# Patient Record
Sex: FEMALE | HISPANIC OR LATINO | URBAN - METROPOLITAN AREA
[De-identification: names, ages, dates, MRNs, and addresses within clinical notes are randomized per-mention and may not be internally consistent; named-entity substitution may affect disease eponyms.]

---

## 2020-11-11 ENCOUNTER — EMERGENCY (EMERGENCY)
Facility: HOSPITAL | Age: 55
LOS: 1 days | Discharge: HOME | End: 2020-11-11
Attending: EMERGENCY MEDICINE
Payer: COMMERCIAL

## 2020-11-11 VITALS
DIASTOLIC BLOOD PRESSURE: 77 MMHG | RESPIRATION RATE: 16 BRPM | HEART RATE: 82 BPM | TEMPERATURE: 98 F | SYSTOLIC BLOOD PRESSURE: 154 MMHG | HEIGHT: 63 IN | WEIGHT: 179.9 LBS | OXYGEN SATURATION: 99 %

## 2020-11-11 VITALS
DIASTOLIC BLOOD PRESSURE: 80 MMHG | RESPIRATION RATE: 15 BRPM | OXYGEN SATURATION: 100 % | SYSTOLIC BLOOD PRESSURE: 141 MMHG | HEART RATE: 70 BPM

## 2020-11-11 PROCEDURE — 99284 EMERGENCY DEPT VISIT MOD MDM: CPT | Mod: 25

## 2020-11-11 PROCEDURE — 99284 EMERGENCY DEPT VISIT MOD MDM: CPT

## 2020-11-11 PROCEDURE — 93971 EXTREMITY STUDY: CPT

## 2020-11-11 PROCEDURE — 73562 X-RAY EXAM OF KNEE 3: CPT | Mod: 26,RT

## 2020-11-11 PROCEDURE — 73562 X-RAY EXAM OF KNEE 3: CPT

## 2020-11-11 PROCEDURE — 93971 EXTREMITY STUDY: CPT | Mod: 26,RT

## 2020-11-11 RX ORDER — ACETAMINOPHEN 500 MG
650 TABLET ORAL ONCE
Refills: 0 | Status: COMPLETED | OUTPATIENT
Start: 2020-11-11 | End: 2020-11-11

## 2020-11-11 RX ADMIN — Medication 650 MILLIGRAM(S): at 19:27

## 2020-11-11 NOTE — ED ADULT NURSE REASSESSMENT NOTE - ED CARDIAC RHYTHM
regular Unna Boot Text: An Unna boot was placed to help immobilize the limb and facilitate more rapid healing.

## 2020-11-11 NOTE — ED PROVIDER NOTE - NS ED ROS FT
General: No fever, no massive bleeding  Head: No HA, no ongoing scalp bleed  ENT: no neck pain, no sore throat  Resp: No SOB, no hemoptysis  Cardiovascular: No CP, No LOC  GI: No abdominal pain, No blood in stool  : No dysuria, no hematuria   MSK: No back pain, + limb pain  Skin: No painful or bleeding lesions  Neuro: No paresthesias, No focal deficits

## 2020-11-11 NOTE — ED PROVIDER NOTE - CLINICAL SUMMARY MEDICAL DECISION MAKING FREE TEXT BOX
55M otherwise healthy here with daughter c/o right knee pain worsening over 3 weeks.  Appears chronic in nature with no obvious signs of inflammatory disease or vascular compromise.  Treat with NSAIDs and follow up with PCM. 55M otherwise healthy here with daughter c/o right knee pain worsening over 3 weeks. with some swelling.  Will get xray and DVT US for further evaluation.

## 2020-11-11 NOTE — ED PROVIDER NOTE - PROGRESS NOTE DETAILS
POLO- sign out to me by Dr. perkins, neg u/s, Pt reassessed, pt feeling better at this time, vss, pt able to walk, talk and vocalized plan of action. Discussed in depth and explained to pt in depth the next steps that need to be taking including proper follow up with PCP or specialists. All incidental findings were discussed with pt as well. Pt verbalized their concerns and all questions were answered. Pt understands dispo and wants discharge. Given good instructions when to return to ED and importance of f/u.

## 2020-11-11 NOTE — ED ADULT NURSE REASSESSMENT NOTE - NS ED NURSE REASSESS COMMENT FT1
received pt aao x4. moving all extremities. some right calf swelling noted ass. w right knee pain. pt denies chest pain, sob, dizziness, nausea, vomiting. pt aao x4. respirations even and unlabored. skin color wnl warm and dry.  pt medicated as ordered, pending US  .

## 2020-11-11 NOTE — ED PROVIDER NOTE - PATIENT PORTAL LINK FT
You can access the FollowMyHealth Patient Portal offered by [facility] by registering at the following website: [email or web address]. By joining [employer]’s FollowMyHealth portal, you will also be able to view your health information using other applications (apps) compatible with our system.

## 2020-11-11 NOTE — ED PROVIDER NOTE - ATTENDING CONTRIBUTION TO CARE
HPI: 56yo f with no PMH presenting with sharp R-sided knee pain x 3 weeks, atraumatic, gradual in onset. Patient took Advil and Tylenol with some relief. Denies calf swelling. No fevers/chills.     PE:  Gen: NAD  Head: NCAT  HEENT: Oral mucosa moist, normal conjunctiva  CV: normal perfusion  Resp: no respiratory distress  Neuro: No focal neuro deficits  MSK: FROM all 4 extremities, no deformity, +TTP over posterior and lateral aspect of R knee, small effusion R knee, no crepitus  Skin: No rash, no bruising  Psych: Normal affect    MDM: Pt with R knee pain x 3 weeks- XR r/o fracture, US RLE r/o deep venous thrombosis, analgesia and reassess. Amy Abernathy DO         I performed a history and physical exam of the patient and discussed their management with the resident. I reviewed the resident's note and agree with the documented findings and plan of care. My medical decision making and observations are found above.

## 2020-11-11 NOTE — ED PROVIDER NOTE - PHYSICAL EXAMINATION
General: NAD, well appearing  HEENT: Normocephalic, EOM intact  Neck: No apparent stiffness or JVD  Pulm: Chest wall symmetric and nontender, lungs clear to ascultation   Cardiac: Regular rate and regular rhythm  Abdomen: Nontender and nondistended  Skin: Skin in warm, dry and intact without rashes or lesions.  Neuro: No apparent motor or sensory deficits  Psych: Alert, oriented, and cooperative  Rt Knee: no crepitus or joint laxity. Ambulatory. Mild swelling when compared to left.

## 2020-11-11 NOTE — ED PROVIDER NOTE - OBJECTIVE STATEMENT
55M otherwise healthy here with daughter c/o right knee pain worsening over 3 weeks.  Patient states it is sharp and all around knee.  No trauma to region.   She had this issue before and reports normal ortho eval years ago (records not available). Otherwise denies pain elsewhere, bleeding, SOB, chest pain, abdominal pain or fevers.  Denies other pertinent medical problems.  Denies any overt substance use.

## 2020-11-11 NOTE — ED ADULT TRIAGE NOTE - CHIEF COMPLAINT QUOTE
Pt A&Ox4 states "I have right knee pain for 3 weeks." Patient denies any injury, has swelling to right knee, able to ambulate

## 2025-01-15 ENCOUNTER — EMERGENCY (EMERGENCY)
Facility: HOSPITAL | Age: 60
LOS: 1 days | Discharge: HOME | End: 2025-01-15
Attending: STUDENT IN AN ORGANIZED HEALTH CARE EDUCATION/TRAINING PROGRAM
Payer: COMMERCIAL

## 2025-01-15 VITALS
HEIGHT: 61 IN | DIASTOLIC BLOOD PRESSURE: 97 MMHG | SYSTOLIC BLOOD PRESSURE: 159 MMHG | TEMPERATURE: 98 F | HEART RATE: 99 BPM | WEIGHT: 193.35 LBS | OXYGEN SATURATION: 99 % | RESPIRATION RATE: 20 BRPM

## 2025-01-15 VITALS
HEART RATE: 83 BPM | DIASTOLIC BLOOD PRESSURE: 79 MMHG | SYSTOLIC BLOOD PRESSURE: 117 MMHG | OXYGEN SATURATION: 97 % | TEMPERATURE: 98 F

## 2025-01-15 DIAGNOSIS — R04.0 EPISTAXIS: ICD-10-CM

## 2025-01-15 RX ORDER — LIDOCAINE HYDROCHLORIDE,EPINEPHRINE BITARTRATE 20; .005 MG/ML; MG/ML
10 INJECTION, SOLUTION EPIDURAL; INFILTRATION; INTRACAUDAL; PERINEURAL ONCE
Refills: 0 | Status: COMPLETED | OUTPATIENT
Start: 2025-01-15 | End: 2025-01-15

## 2025-01-15 RX ORDER — TRANEXAMIC ACID 650 MG/1
5 TABLET ORAL ONCE
Refills: 0 | Status: DISCONTINUED | OUTPATIENT
Start: 2025-01-15 | End: 2025-01-22

## 2025-01-15 RX ORDER — OXYMETAZOLINE HYDROCHLORIDE 0.05 G/100ML
2 SPRAY, METERED NASAL ONCE
Refills: 0 | Status: COMPLETED | OUTPATIENT
Start: 2025-01-15 | End: 2025-01-15

## 2025-01-15 RX ADMIN — OXYMETAZOLINE HYDROCHLORIDE 2 SPRAY(S): 0.05 SPRAY, METERED NASAL at 06:58

## 2025-01-15 RX ADMIN — LIDOCAINE HYDROCHLORIDE,EPINEPHRINE BITARTRATE 10 MILLILITER(S): 20; .005 INJECTION, SOLUTION EPIDURAL; INFILTRATION; INTRACAUDAL; PERINEURAL at 06:58

## 2025-01-15 NOTE — ED ADULT NURSE NOTE - OBJECTIVE STATEMENT
PT is alert and oriented, with a patent and self maintained airway, with non labored breathing. PT c/o of nose bleed since 0200, woke her up. Denies anti coag use.    PT denies CP, SOB, HA, N/V/D, Fevers or chills.

## 2025-01-15 NOTE — ED ADULT NURSE REASSESSMENT NOTE - NS ED NURSE REASSESS COMMENT FT1
Assumed pt care from previous, night RN HUANGW at this time. Patient AA&Ox4, resp even/unlabored, resting comfortably in bed, awaiting to be seen by provider. No signs of acute distress noted, safety maintained.

## 2025-01-15 NOTE — ED PROVIDER NOTE - OBJECTIVE STATEMENT
60 yo female no PMHx presents to ED c/o nose bleed x4 hours. Woke up from sleep. Did not self medicate PTA. No other complaints at this time.   Denies h/o nose bleeds, blood thinners, dizziness, lightheadedness.

## 2025-01-15 NOTE — ED PROVIDER NOTE - ATTENDING APP SHARED VISIT CONTRIBUTION OF CARE
Pt with four hours of R-sided nosebleed. unable to get it to stop. no AC. no other complaints.    physical - small bleeding from R nare. no septal hematoma.  +blood in the back of the throat.    plan - attempted tXA and packing but continued to bleed in back of throat and around packing.  ENT consulted for further management. if bleeding controlled to d/c with return and f/up instructions.

## 2025-01-15 NOTE — ED PROVIDER NOTE - PATIENT PORTAL LINK FT
You can access the FollowMyHealth Patient Portal offered by [facility] by registering at the following website: [email or web address]. By joining [employer]’s FollowMyHealth portal, you will also be able to view your health information using other applications (apps) compatible with our system.

## 2025-01-15 NOTE — ED PROVIDER NOTE - ADDITIONAL NOTES AND INSTRUCTIONS:
PT was evaluated At [facility] ED and was found to have a condition that warranted time of to rest and heal from WORK/SCHOOL.   Messi Valdez PA-C

## 2025-01-15 NOTE — ED PROVIDER NOTE - CLINICAL SUMMARY MEDICAL DECISION MAKING FREE TEXT BOX
60 yo female no PMHx presents to ED c/o nose bleed x4 hours. 60 yo female no PMHx presents to ED c/o nose bleed x4 hours. VS stable. On exam patient trickling blood out of left nostril, no hemorrhage. 60 yo female no PMHx presents to ED c/o nose bleed x4 hours. 60 yo female no PMHx presents to ED c/o nose bleed x4 hours. VS stable. On exam patient trickling blood out of left nostril, no hemorrhage. Plan to trial Afrin. Patient signed out to AM team with plan of if continues to bleed, will need nasal packing. 60 yo female no PMHx presents to ED c/o nose bleed x4 hours. 60 yo female no PMHx presents to ED c/o nose bleed x4 hours. VS stable. On exam patient trickling blood out of left nostril, no hemorrhage. Plan to trial Afrin. Patient signed out to AM team with plan of if continues to bleed, will need nasal packing.    Messi Valdez PA-C: PT signed out to YADY valdez after lengthy discussion about case with Dio. Packing placed by myself failed to control bleeding, ENT called to consult of pt, bleeding controlled by ENT, pt dc home with supportive care, follow up to PCP, ENT, Pt educated about when to return to the ED if needed. PT verbalizes that he understands all instructions and results. Pt informed that ED is open and available 24/7 365 days a yr, encouraged to return to the ED if they have any change in condition, or feel the need for revaluation.

## 2025-01-15 NOTE — ED PROVIDER NOTE - NSFOLLOWUPINSTRUCTIONS_ED_ALL_ED_FT
- Please call today to schedule follow up appointment with ENT specialist.   - For recurrence of nose bleed, apply direct pressure for 15 minutes.   - Please bring all documentation from your ED visit to any related future follow up appointment.  - Please call to schedule follow up appointment with your primary care physician within 24-48 hours.  - Please seek immediate medical attention or return to the ED for any new/worsening, signs/symptoms, or concerns.    Feel better!     - Llame hoy para programar lucy david de seguimiento con un otorrinolaringólogo.  - Para la recurrencia del sangrado nasal, aplique presión directa isidro 15 minutos.  - Traiga toda la documentación de hernandez visita al ED a cualquier david de seguimiento futura relacionada.  - Llame para programar lucy david de seguimiento con hernandez médico de atención primaria dentro de las 24 a 48 horas.  - Busque atención médica inmediata o regrese al servicio de urgencias por cualquier signo/síntoma o inquietud nuevos/empeorados.    ¡Sentirse mejor!      Hemorragia nasal en los adultos    Nosebleed, Adult      Cuando hay hemorragia nasal, sale james de la nariz. Las hemorragias nasales son frecuentes. Por lo general, no son un signo de lucy afección grave.    Puede vincent lucy hemorragia nasal cuando un vaso sanguíneo de la nariz comienza a sangrar o si el recubrimiento de la nariz (membrana mucosa) se agrieta. Las causas frecuentes de las hemorragias nasales pueden ser las siguientes:  •Alergias.      •Resfríos.      •Hurgarse la nariz.      •Sonarse la nariz con demasiada intensidad.      •Lucy lesión por meterse un objeto en la nariz o recibir un golpe en la nariz.      •Aire seco o frío.      Algunas causas menos frecuentes de las hemorragias nasales incluyen lo siguiente:  •Gases tóxicos.      •Algo anormal en la nariz o en los espacios llenos de aire en los huesos de la sneha (senos).      •Crecimientos en la nariz, timothy pólipos.      •Anticoagulantes o afecciones que retrasan la coagulación de la james.      •Ciertas enfermedades o procedimientos que irritan o secan las fosas nasales.        Siga estas instrucciones en hernandez casa:      Si tiene lucy hemorragia nasal:      •Siéntese e incline la asim ligeramente hacia adelante.      •Utilice lucy toalla o un pañuelo de papel limpios para apretar los orificios nasales debajo de la parte ósea de la nariz. Después de 5 minutos, suelte la nariz y compruebe si vuelve a sangrar. No quite la presión antes de haven tiempo. Si aún hay hemorragia, repita la presión y sosténgala isidro 5 minutos o hasta que la hemorragia se detenga.      • No coloque pañuelos de papel ni gasa en la nariz para detener la hemorragia.      •Evite recostarse e inclinar la asim hacia atrás. Eso puede provocar lucy acumulación de james en la garganta y producir ahogo o tos.      •Use un aerosol nasal descongestivo para aliviar lucy hemorragia nasal timothy se lo haya indicado el médico.      Después de lucy hemorragia nasal:     •Evite sonarse la nariz u olfatear isidro unas cuantas horas.      •No sonja esfuerzos, no levante objetos y no doble la cintura para agacharse isidro varios días. Puede volver a realizar otras actividades normales tan pronto timothy pueda.    •Si está tomando aspirina o anticoagulantes y tiene hemorragias nasales, hable con hernandez médico. Estos medicamentos pueden favorecer el sangrado.  •Pregúntele al médico si debe dejar de matt los medicamentos o si debe ajustar la dosis.      •No deje de matt los medicamentos que el médico le ha recomendado, excepto si se jacinda le indica que deje de tomarlos.      •Si la hemorragia nasal se debe a la sequedad de las membranas mucosas, use un gel o aerosol nasal de solución salina de venta jean y un humidificador timothy se lo haya indicado el médico. Denio mantendrá las mucosas húmedas y permitirá que se cicatricen. Si necesita usar yeison de estos productos:  •Elija yeison que sea soluble en agua.      •Use solamente la cantidad que necesita y con la frecuencia necesaria.      •No se acueste inmediatamente después de usarlo.      •Si tiene hemorragias nasales con frecuencia, hable con el médico sobre los tratamientos médicos. Las opciones pueden incluir lo siguiente:  •Cauterización nasal. Jacinda tratamiento detiene y previene las hemorragias nasales mediante el uso de un hisopo con lucy sustancia química o un dispositivo eléctrico con el que se queman ligeramente los vasos sanguíneos diminutos que están dentro de la nariz.      •Taponamiento nasal. Se coloca lucy gasa u otro material en la nariz para ejercer presión hafsa sobre la miroslava de la hemorragia.          Comuníquese con un médico si:    •Tiene fiebre.      •Tiene hemorragias nasales frecuentes o con mayor frecuencia que lo habitual.      •Presenta moretones con mucha facilidad.      •Tiene lucy hemorragia nasal por tener algo metido en la nariz.      •Tiene sangrado en la boca.      •Vomita o libera lucy sustancia marrón al toser.      •Tiene lucy hemorragia nasal después de comenzar un medicamento nuevo.        Solicite ayuda de inmediato si:    •Tiene lucy hemorragia nasal después de lucy caída o lucy lesión en la asim.      •Tiene lucy hemorragia nasal que no desaparece después de 20 minutos.      •Se siente mareado o débil.      •Tiene hemorragias fuera de lo común en otras partes del cuerpo.      •Tiene moretones fuera de lo común en otras partes del cuerpo.      •Tiene sudoración.      •Vomita james.        Resumen    •Cuando hay hemorragia nasal, sale james de la nariz. Las causas más frecuentes incluyen alergias, ulcy lesión en la nariz o aire frío o seco.      •El tratamiento inicial incluye aplicar presión isidro 5 minutos.       •Humectar la nariz con gel o aerosol nasal con solución salina después de lucy hemorragia nasal puede ayudar a prevenir futuras hemorragias.      •Busque ayuda de inmediato si la hemorragia nasal no desaparece después de 20 minutos.      Esta información no tiene timothy fin reemplazar el consejo del médico. Asegúrese de hacerle al médico cualquier pregunta que tenga.

## 2025-01-15 NOTE — CONSULT NOTE ADULT - PROBLEM SELECTOR RECOMMENDATION 9
-Pt encouraged to ambulate before being discharged to home  -All packings are dissolvable  -Keep nose moist: sleep with a humidifier, use saline sprays and gels frequently  -Followup with Dr. Michaels as needed

## 2025-01-15 NOTE — ED ADULT TRIAGE NOTE - CHIEF COMPLAINT QUOTE
PT reports to ED with complaints of nose bleed, reports nose bleed started since two in the morning. direct pressure applied in triage, blood noted to be trickling out of left nare.

## 2025-01-15 NOTE — ED PROVIDER NOTE - PHYSICAL EXAMINATION
Gen: Nontoxic, well appearing, in NAD.  Skin: Warm and dry as visualized.  Head: NC/AT.  Eyes: PERRLA. EOMI.  Nose: No septal hematoma. Right nostril not bleeding. Left nostril trickling blood. Unable to visualize bleeding site.   Mouth: Patent. No compromise.   Neck: Supple, FROM. Trachea midline.   Resp: No distress.  Cardio: Well perfused.  Ext: No deformities. MAEx4. FROM.   Neuro: A&Ox3. Appears nonfocal.   Psych: Normal affect and mood.

## 2025-01-15 NOTE — CONSULT NOTE ADULT - SUBJECTIVE AND OBJECTIVE BOX
ENT ISSUE/POD: L nosebleed    HPI:  58 yo female no PMHx presents to ED c/o nose bleed since 2:30 am. Woke up from sleep. Did not self medicate PTA. No other complaints at this time.   Denies h/o nose bleeds, blood thinners, dizziness, lightheadedness.  ENT consulted as bleeding refractory to TXA, afrin and 5.5 rapid rhino packing.          PAST MEDICAL & SURGICAL HISTORY:    Allergies    No Known Allergies    Intolerances      MEDICATIONS  (STANDING):  tranexamic acid Injectable for Topical Use 5 milliLiter(s) Topical once    MEDICATIONS  (PRN):      Social History:  No reported  toxic habits    Family history: No significant medical hx in first  degree relatives       ROS:   ENT: all negative except as noted in HPI   Pulm: denies SOB, cough, hemoptysis  Neuro: denies numbness/tingling, loss of sensation  Endo: denies heat/cold intolerance, excessive sweating      Vital Signs Last 24 Hrs  T(C): 36.6 (15 Cortez 2025 05:51), Max: 36.6 (15 Cortez 2025 05:51)  T(F): 97.8 (15 Cortez 2025 05:51), Max: 97.8 (15 Cortez 2025 05:51)  HR: 99 (15 Cortez 2025 05:51) (99 - 99)  BP: 159/97 (15 Cortez 2025 05:51) (159/97 - 159/97)  BP(mean): --  RR: 20 (15 Cortez 2025 05:51) (20 - 20)  SpO2: 99% (15 Cortez 2025 05:51) (99% - 99%)    Parameters below as of 15 Cortez 2025 05:51  Patient On (Oxygen Delivery Method): room air                  PHYSICAL EXAM:  Gen: NAD  Skin: No rashes, bruises, or lesions  Head: Normocephalic, Atraumatic  Face: no edema, erythema, or fluctuance. Parotid glands soft without mass  Eyes: no scleral injection  Nose: R nare patent, L nare packed w RR and oozing around packing anteriorly  Mouth: No Stridor / Drooling / Trismus.  Mucosa moist, tongue/uvula midline, mild blood streaking, no active oozing  Neck: Flat, supple, no lymphadenopathy, trachea midline, no masses  Lymphatic: No lymphadenopathy  Resp: breathing easily, no stridor  Neuro: facial nerve intact, no facial droop      Procedure: Control of nasal hemorrhage  L nasal packing removed, L nare suctioned. Septum intact with no abrasions or aberrant vessels.    ENT ISSUE/POD: L nosebleed    HPI:  58 yo female no PMHx presents to ED c/o nose bleed since 2:30 am. Woke up from sleep. Did not self medicate PTA. No other complaints at this time.   Denies h/o nose bleeds, blood thinners, dizziness, lightheadedness.  ENT consulted as bleeding refractory to TXA, afrin and 5.5 rapid rhino packing.          PAST MEDICAL & SURGICAL HISTORY:    Allergies    No Known Allergies    Intolerances      MEDICATIONS  (STANDING):  tranexamic acid Injectable for Topical Use 5 milliLiter(s) Topical once    MEDICATIONS  (PRN):      Social History:  No reported  toxic habits    Family history: No significant medical hx in first  degree relatives       ROS:   ENT: all negative except as noted in HPI   Pulm: denies SOB, cough, hemoptysis  Neuro: denies numbness/tingling, loss of sensation  Endo: denies heat/cold intolerance, excessive sweating      Vital Signs Last 24 Hrs  T(C): 36.6 (15 Cortez 2025 05:51), Max: 36.6 (15 Cortez 2025 05:51)  T(F): 97.8 (15 Cortez 2025 05:51), Max: 97.8 (15 Cortez 2025 05:51)  HR: 99 (15 Cortez 2025 05:51) (99 - 99)  BP: 159/97 (15 Cortez 2025 05:51) (159/97 - 159/97)  BP(mean): --  RR: 20 (15 Cortez 2025 05:51) (20 - 20)  SpO2: 99% (15 Cortez 2025 05:51) (99% - 99%)    Parameters below as of 15 Cortez 2025 05:51  Patient On (Oxygen Delivery Method): room air                  PHYSICAL EXAM:  Gen: NAD  Skin: No rashes, bruises, or lesions  Head: Normocephalic, Atraumatic  Face: no edema, erythema, or fluctuance. Parotid glands soft without mass  Eyes: no scleral injection  Nose: R nare patent, L nare packed w RR and oozing around packing anteriorly  Mouth: No Stridor / Drooling / Trismus.  Mucosa moist, tongue/uvula midline, mild blood streaking, no active oozing  Neck: Flat, supple, no lymphadenopathy, trachea midline, no masses  Lymphatic: No lymphadenopathy  Resp: breathing easily, no stridor  Neuro: facial nerve intact, no facial droop      Procedure: Control of nasal hemorrhage  L nasal packing removed, L nare suctioned. Septum intact with no abrasions or aberrant vessels. Cotton soaked in TXA and afrin placed for ten minutes. L nare packed with surgicel and surgiflo

## 2025-01-15 NOTE — ED PROVIDER NOTE - CARE PROVIDER_API CALL
Gonzalo Pedro  Otolaryngology  [address] [place] [address], [address]  [place] [address]  Phone: [phone]  Fax: [phone]  Follow Up Time: